# Patient Record
Sex: FEMALE | URBAN - METROPOLITAN AREA
[De-identification: names, ages, dates, MRNs, and addresses within clinical notes are randomized per-mention and may not be internally consistent; named-entity substitution may affect disease eponyms.]

---

## 2017-01-19 ENCOUNTER — IMPORTED ENCOUNTER (OUTPATIENT)
Dept: URBAN - METROPOLITAN AREA CLINIC 1 | Facility: CLINIC | Age: 67
End: 2017-01-19

## 2017-01-19 PROBLEM — E11.9: Noted: 2017-01-19

## 2017-01-19 PROBLEM — H16.223: Noted: 2017-01-19

## 2017-01-19 PROBLEM — E11.37X3: Noted: 2017-01-19

## 2017-01-19 PROBLEM — H25.813: Noted: 2017-01-19

## 2017-01-19 PROBLEM — H40.013: Noted: 2017-01-19

## 2017-01-19 PROBLEM — Z79.4: Noted: 2017-01-19

## 2017-01-19 PROCEDURE — 92250 FUNDUS PHOTOGRAPHY W/I&R: CPT

## 2017-01-19 PROCEDURE — 92004 COMPRE OPH EXAM NEW PT 1/>: CPT

## 2017-01-19 PROCEDURE — 92015 DETERMINE REFRACTIVE STATE: CPT

## 2017-01-19 NOTE — PATIENT DISCUSSION
1.  DM Type II with DM with DME resolved following treatment OU. HX of PRP scars. Mac photos were ordered and done today showing DR2. Type II Insulin dependent diabetes mellitus3. Cataract OU:  Visually Significant discussed the risks benefits alternatives and limitations of cataract surgery. The patient stated a full understanding and a desire to proceed with the procedure. The patient will need to return for preop appointment with cataract measurements and to have any additional questions answered and start pre-operative eye drops as directed. Not MF candidate. OS then OD. Patient saw PMG today  [place] PCLOtherwise follow-up4. Keratoconjunctivitis Sicca OU-Gave patient a sample of Systane Balance 5. COAG suspect OU: (0.65/0.60)  IOP was 22/21. Unknown Fm HX. Condition was discussed with patient. Will monitor patient for progression. *** REF patient to retinal specialist after second eye CATS Sx. 6.  Return for an appointment for H and P. with Dr. Enio Mckoy.

## 2022-04-08 ASSESSMENT — KERATOMETRY
OD_K1POWER_DIOPTERS: 42.50
OS_AXISANGLE_DEGREES: 180
OS_K1POWER_DIOPTERS: 43.00
OD_AXISANGLE_DEGREES: 176
OS_AXISANGLE2_DEGREES: 090
OD_K2POWER_DIOPTERS: 41.75
OS_K2POWER_DIOPTERS: 42.25
OD_AXISANGLE2_DEGREES: 086

## 2022-04-08 ASSESSMENT — VISUAL ACUITY
OD_CC: 20/200
OD_CC: J5
OD_GLARE: 20/400
OS_CC: 20/400
OS_GLARE: 20/400
OS_CC: J5

## 2022-04-08 ASSESSMENT — TONOMETRY
OD_IOP_MMHG: 22
OS_IOP_MMHG: 21